# Patient Record
Sex: MALE | Race: WHITE | NOT HISPANIC OR LATINO | Employment: FULL TIME | URBAN - METROPOLITAN AREA
[De-identification: names, ages, dates, MRNs, and addresses within clinical notes are randomized per-mention and may not be internally consistent; named-entity substitution may affect disease eponyms.]

---

## 2024-09-13 ENCOUNTER — HOSPITAL ENCOUNTER (OUTPATIENT)
Dept: RADIOLOGY | Facility: HOSPITAL | Age: 39
Discharge: HOME | End: 2024-09-13
Attending: ORTHOPAEDIC SURGERY
Payer: COMMERCIAL

## 2024-09-13 ENCOUNTER — OFFICE VISIT (OUTPATIENT)
Facility: CLINIC | Age: 39
End: 2024-09-13
Payer: COMMERCIAL

## 2024-09-13 VITALS — BODY MASS INDEX: 37.77 KG/M2 | HEIGHT: 69 IN | WEIGHT: 255 LBS

## 2024-09-13 DIAGNOSIS — M25.511 ACUTE PAIN OF RIGHT SHOULDER: Primary | ICD-10-CM

## 2024-09-13 DIAGNOSIS — S46.811A STRAIN OF RIGHT TRAPEZIUS MUSCLE, INITIAL ENCOUNTER: Primary | ICD-10-CM

## 2024-09-13 DIAGNOSIS — M25.511 ACUTE PAIN OF RIGHT SHOULDER: ICD-10-CM

## 2024-09-13 PROCEDURE — 73030 X-RAY EXAM OF SHOULDER: CPT | Mod: 26,RT,, | Performed by: RADIOLOGY

## 2024-09-13 PROCEDURE — 73030 X-RAY EXAM OF SHOULDER: CPT | Mod: TC,PN,RT

## 2024-09-13 PROCEDURE — 99999 PR PBB SHADOW E&M-NEW PATIENT-LVL III: CPT | Mod: PBBFAC,,, | Performed by: ORTHOPAEDIC SURGERY

## 2024-09-13 RX ORDER — BUPROPION HYDROCHLORIDE 150 MG/1
1 TABLET ORAL EVERY MORNING
COMMUNITY
Start: 2024-08-26 | End: 2025-08-26

## 2024-09-13 RX ORDER — LISINOPRIL 10 MG/1
10 TABLET ORAL DAILY
COMMUNITY
Start: 2024-08-26 | End: 2025-02-22

## 2024-09-14 NOTE — PROGRESS NOTES
KATI Glass M.D.  Orthopaedic Hand and Wrist Surgery  [place] [facility]    Patient ID: Messi Isaac  YOB: 1985  MRN: [id number]    Provider Note/Medical Decision Makin. Strain of right trapezius muscle, initial encounter  Assessment & Plan:  The patient and I talked at length about the natural history and pathophysiology of his injury which is Right Trapezial strain , he understands that this may lead to chronic problems which may have acute episodic exacerbations.   Symptoms may resolve, worsen and even become permanent.  The patient understands the treatment options including observation, activity modification, therapy, NSAIDs and the possibility for further diagnostic options.  he patients treatment is further complicated by chronic cervical radiculopathy without weakness.  The patient has elected to proceed with NSAIDs, observation, exercises and we will follow up PRN.  He would like to see a pain management doc for possible cervical GERHARD.  We will send a referral.      Orders:  -     Ambulatory referral/consult to Pain Clinic; Future; Expected date: 2024         Chief Complaint: Pain of the Right Shoulder      Referred By: Self,Aaareferral    History of Present Illness: Messi Isaac is a 39 y.o. male  with a chief complaint of Pain of the Right Shoulder    Lul was trying to pull start a generator today and did not feel a pop but had immediate right shoulder pain.  He has been unable to lift his arm above his head since the injury he feels like it maybe get a little bit better but he has not wanted to try to move his arm much.  He does have a longstanding history of right cervical radiculopathy with shooting pain down his arm he denies any weakness in his right upper extremity prior to this incident    Patient was queried and this is the extent of the patients current complaints today.    Past Medical History:     Estimated body mass index is 37.66 kg/m² as  "calculated from the following:    Height as of this encounter: 5' 9" (1.753 m).    Weight as of this encounter: 115.7 kg (255 lb).  Past Medical History:   Diagnosis Date    Hypertension      Past Surgical History:   Procedure Laterality Date    SKIN GRAFT       No family history on file.  Social History     Socioeconomic History    Marital status: Single   Tobacco Use    Smoking status: Never    Smokeless tobacco: Never   Substance and Sexual Activity    Alcohol use: Not Currently     Medication List with Changes/Refills   Current Medications    BUPROPION (WELLBUTRIN XL) 150 MG TB24 TABLET    Take 1 tablet by mouth every morning.    LISINOPRIL 10 MG TABLET    Take 10 mg by mouth once daily.     Review of patient's allergies indicates:  No Known Allergies  ROS    Physical Exam:   GENERAL: Well appearing, appropriate for stated age, no acute distress.  CARDIOVASCULAR:  Fingers have good brisk refill and good turgor.   PULMONARY: Respirations are even and non-labored.  NEURO: Awake, alert, and oriented x 3.  PSYCH: Mood & affect are appropriate.  HEENT: Head is normocephalic and atraumatic.  Ortho/SPM Exam  Hand/Wrist Musculoskeletal Exam  No pain with neck range of motion no midline tenderness in his neck he does have tenderness in his right trapezium he feels like his symptoms are much better or even since he checked in.  He has 5/5  wrist extension wrist flexion elbow flexion elbow extension scaption internal rotation and external rotation of his shoulder no pain with Speed and Yergason's.  Negative Neer and Barksdale.  No tenderness along his AC joint or clavicle.  He has full shoulder range of motion actively.    Imaging:    Relevant imaging results reviewed and interpreted by me, discussed with the patient and / or family today.   X-rays were reviewed of his right shoulder which showed no fracture or dislocation    Other Tests:     None    Provider Note/Medical Decision Makin. Strain of right trapezius " muscle, initial encounter  Assessment & Plan:  The patient and I talked at length about the natural history and pathophysiology of his injury which is Right Trapezial strain , he understands that this may lead to chronic problems which may have acute episodic exacerbations.   Symptoms may resolve, worsen and even become permanent.  The patient understands the treatment options including observation, activity modification, therapy, NSAIDs and the possibility for further diagnostic options.  he patients treatment is further complicated by chronic cervical radiculopathy without weakness.  The patient has elected to proceed with NSAIDs, observation, exercises and we will follow up PRN.  He would like to see a pain management doc for possible cervical GERHARD.  We will send a referral.      Orders:  -     Ambulatory referral/consult to Pain Clinic; Future; Expected date: 09/20/2024         I discussed worrisome and red flag signs and symptoms with the patient. The patient expressed understanding and agreed to alert me immediately or to go to the emergency room if they experience any of these.   Treatment plan was developed with input from the patient/family, and they expressed understanding and agreement with the plan. All questions were answered today.    There are no Patient Instructions on file for this visit.    KATI Glass M.D.  [facility]  Orthopedic Hand and Wrist Surgeon    [facility]  Dr. Vaibhav Glass   [employer] Review   [employer]   [employer]     Disclaimer: This note was prepared using a voice recognition system and is likely to have sound alike errors within the text.

## 2024-09-14 NOTE — ASSESSMENT & PLAN NOTE
The patient and I talked at length about the natural history and pathophysiology of his injury which is Right Trapezial strain , he understands that this may lead to chronic problems which may have acute episodic exacerbations.   Symptoms may resolve, worsen and even become permanent.  The patient understands the treatment options including observation, activity modification, therapy, NSAIDs and the possibility for further diagnostic options.  he patients treatment is further complicated by chronic cervical radiculopathy without weakness.  The patient has elected to proceed with NSAIDs, observation, exercises and we will follow up PRN.  He would like to see a pain management doc for possible cervical GERHARD.  We will send a referral.